# Patient Record
Sex: MALE | Race: WHITE | NOT HISPANIC OR LATINO | Employment: UNEMPLOYED | URBAN - METROPOLITAN AREA
[De-identification: names, ages, dates, MRNs, and addresses within clinical notes are randomized per-mention and may not be internally consistent; named-entity substitution may affect disease eponyms.]

---

## 2019-09-10 ENCOUNTER — HOSPITAL ENCOUNTER (EMERGENCY)
Facility: MEDICAL CENTER | Age: 24
End: 2019-09-10
Attending: EMERGENCY MEDICINE
Payer: COMMERCIAL

## 2019-09-10 VITALS
HEART RATE: 102 BPM | HEIGHT: 71 IN | BODY MASS INDEX: 19.07 KG/M2 | SYSTOLIC BLOOD PRESSURE: 117 MMHG | RESPIRATION RATE: 14 BRPM | WEIGHT: 136.24 LBS | DIASTOLIC BLOOD PRESSURE: 91 MMHG | TEMPERATURE: 97.9 F

## 2019-09-10 DIAGNOSIS — Z20.2 STD EXPOSURE: ICD-10-CM

## 2019-09-10 PROCEDURE — 99281 EMR DPT VST MAYX REQ PHY/QHP: CPT

## 2019-09-10 SDOH — HEALTH STABILITY: MENTAL HEALTH: HOW MANY STANDARD DRINKS CONTAINING ALCOHOL DO YOU HAVE ON A TYPICAL DAY?: 1 OR 2

## 2019-09-10 SDOH — HEALTH STABILITY: MENTAL HEALTH: HOW OFTEN DO YOU HAVE A DRINK CONTAINING ALCOHOL?: 2-3 TIMES A WEEK

## 2019-09-11 NOTE — ED PROVIDER NOTES
"ED Provider Note    CHIEF COMPLAINT  Chief Complaint   Patient presents with   • Exposure to STD     possible exposure to herpes       HPI  Mukul Wilson is a 24 y.o. male who presents for evaluation for possible exposure to herpes.  The patient states his ex-wife told him that she had contracted herpes.  The patient presents in an asymptomatic state asking for testing.  He has not had any penile drainage nor discharge.  Does not have any lesions to his penis.  The patient states he is otherwise healthy.    REVIEW OF SYSTEMS  No recent fevers, no nausea or vomiting    PHYSICAL EXAM  VITAL SIGNS: /91   Pulse (!) 102   Temp 36.6 °C (97.9 °F) (Temporal)   Resp 14   Ht 1.803 m (5' 11\")   Wt 61.8 kg (136 lb 3.9 oz)   BMI 19.00 kg/m²   In general the patient does not appear toxic    GI abdomen is soft    External genitalia there is no penile ulcerations nor tenderness noted.  He has a normal testicular examination.  No inguinal adenopathy, no penile drainage    Skin otherwise no lesions    COURSE & MEDICAL DECISION MAKING  Pertinent Labs & Imaging studies reviewed. (See chart for details)  Is a 24-year-old male who states he may have been exposed to herpes.  He has not had any symptoms nor any open lesions.  I told the patient we cannot test for herpes unless he has an open lesion.  The patient states he does not have any penile drainage nor is he been exposed to gonorrhea and chlamydia.  Therefore he does not want testing for gonorrhea and chlamydia at this time.  The patient be discharged with instructions to return for any further symptoms.    FINAL IMPRESSION  1.  Evaluation for possible herpes exposure    Disposition  The patient will be discharged in stable condition      Electronically signed by: Albert Coelho, 9/10/2019 6:15 PM      "

## 2020-01-07 NOTE — ED TRIAGE NOTES
Chief Complaint   Patient presents with   • Exposure to STD     possible exposure to herpes     Triage process explained to patient.  Pt back to waiting room.  Pt instructed to inform RN if any changes or questions arise.     [Normal Appearance] : normal appearance [General Appearance - Well Developed] : well developed [Well Groomed] : well groomed [General Appearance - Well Nourished] : well nourished [No Deformities] : no deformities [Normal Conjunctiva] : the conjunctiva exhibited no abnormalities [Eyelids - No Xanthelasma] : the eyelids demonstrated no xanthelasmas [General Appearance - In No Acute Distress] : no acute distress [Normal Oral Mucosa] : normal oral mucosa [No Oral Pallor] : no oral pallor [No Oral Cyanosis] : no oral cyanosis [Normal Jugular Venous A Waves Present] : normal jugular venous A waves present [Normal Jugular Venous V Waves Present] : normal jugular venous V waves present [No Jugular Venous Bernardo A Waves] : no jugular venous bernardo A waves [Exaggerated Use Of Accessory Muscles For Inspiration] : no accessory muscle use [Respiration, Rhythm And Depth] : normal respiratory rhythm and effort [Auscultation Breath Sounds / Voice Sounds] : lungs were clear to auscultation bilaterally [Heart Rate And Rhythm] : heart rate and rhythm were normal [Murmurs] : no murmurs present [Arterial Pulses Normal] : the arterial pulses were normal [Heart Sounds] : normal S1 and S2 [Edema] : no peripheral edema present [Abdomen Soft] : soft [Abdomen Mass (___ Cm)] : no abdominal mass palpated [Abdomen Tenderness] : non-tender [Nail Clubbing] : no clubbing of the fingernails [Gait - Sufficient For Exercise Testing] : the gait was sufficient for exercise testing [Abnormal Walk] : normal gait [Petechial Hemorrhages (___cm)] : no petechial hemorrhages [Cyanosis, Localized] : no localized cyanosis [Oriented To Time, Place, And Person] : oriented to person, place, and time [] : no ischemic changes [No Anxiety] : not feeling anxious [Mood] : the mood was normal [Affect] : the affect was normal